# Patient Record
Sex: FEMALE | Race: WHITE | NOT HISPANIC OR LATINO
[De-identification: names, ages, dates, MRNs, and addresses within clinical notes are randomized per-mention and may not be internally consistent; named-entity substitution may affect disease eponyms.]

---

## 2017-04-06 ENCOUNTER — RESULT REVIEW (OUTPATIENT)
Age: 47
End: 2017-04-06

## 2017-04-07 PROBLEM — Z00.00 ENCOUNTER FOR PREVENTIVE HEALTH EXAMINATION: Status: ACTIVE | Noted: 2017-04-07

## 2017-04-13 ENCOUNTER — OUTPATIENT (OUTPATIENT)
Dept: OUTPATIENT SERVICES | Facility: HOSPITAL | Age: 47
LOS: 1 days | End: 2017-04-13

## 2017-04-13 ENCOUNTER — APPOINTMENT (OUTPATIENT)
Dept: MAMMOGRAPHY | Facility: CLINIC | Age: 47
End: 2017-04-13

## 2017-04-13 ENCOUNTER — APPOINTMENT (OUTPATIENT)
Dept: ULTRASOUND IMAGING | Facility: CLINIC | Age: 47
End: 2017-04-13

## 2017-05-04 ENCOUNTER — OUTPATIENT (OUTPATIENT)
Dept: OUTPATIENT SERVICES | Facility: HOSPITAL | Age: 47
LOS: 1 days | End: 2017-05-04

## 2017-05-04 DIAGNOSIS — Z00.8 ENCOUNTER FOR OTHER GENERAL EXAMINATION: ICD-10-CM

## 2018-04-17 ENCOUNTER — RESULT REVIEW (OUTPATIENT)
Age: 48
End: 2018-04-17

## 2018-12-17 ENCOUNTER — RESULT REVIEW (OUTPATIENT)
Age: 48
End: 2018-12-17

## 2019-07-10 ENCOUNTER — APPOINTMENT (OUTPATIENT)
Dept: MAMMOGRAPHY | Facility: CLINIC | Age: 49
End: 2019-07-10

## 2019-07-10 ENCOUNTER — APPOINTMENT (OUTPATIENT)
Dept: ULTRASOUND IMAGING | Facility: CLINIC | Age: 49
End: 2019-07-10

## 2021-10-26 ENCOUNTER — TRANSCRIPTION ENCOUNTER (OUTPATIENT)
Age: 51
End: 2021-10-26

## 2024-09-18 ENCOUNTER — APPOINTMENT (OUTPATIENT)
Dept: ORTHOPEDIC SURGERY | Age: 54
End: 2024-09-18
Payer: MEDICAID

## 2024-09-18 VITALS
SYSTOLIC BLOOD PRESSURE: 118 MMHG | HEIGHT: 63 IN | HEART RATE: 69 BPM | DIASTOLIC BLOOD PRESSURE: 80 MMHG | OXYGEN SATURATION: 98 % | BODY MASS INDEX: 20.31 KG/M2 | WEIGHT: 114.64 LBS

## 2024-09-18 DIAGNOSIS — Z96.641 PRESENCE OF RIGHT ARTIFICIAL HIP JOINT: ICD-10-CM

## 2024-09-18 DIAGNOSIS — M16.12 UNILATERAL PRIMARY OSTEOARTHRITIS, LEFT HIP: ICD-10-CM

## 2024-09-18 PROCEDURE — 99204 OFFICE O/P NEW MOD 45 MIN: CPT

## 2024-09-18 PROCEDURE — 73522 X-RAY EXAM HIPS BI 3-4 VIEWS: CPT

## 2024-09-20 ENCOUNTER — TRANSCRIPTION ENCOUNTER (OUTPATIENT)
Age: 54
End: 2024-09-20

## 2024-09-20 ENCOUNTER — APPOINTMENT (OUTPATIENT)
Dept: ORTHOPEDIC SURGERY | Facility: CLINIC | Age: 54
End: 2024-09-20
Payer: MEDICAID

## 2024-09-20 VITALS
OXYGEN SATURATION: 99 % | HEIGHT: 63 IN | TEMPERATURE: 98.3 F | HEART RATE: 53 BPM | DIASTOLIC BLOOD PRESSURE: 70 MMHG | BODY MASS INDEX: 20.2 KG/M2 | SYSTOLIC BLOOD PRESSURE: 106 MMHG | WEIGHT: 114 LBS

## 2024-09-20 DIAGNOSIS — M54.16 RADICULOPATHY, LUMBAR REGION: ICD-10-CM

## 2024-09-20 DIAGNOSIS — M43.17 SPONDYLOLISTHESIS, LUMBOSACRAL REGION: ICD-10-CM

## 2024-09-20 DIAGNOSIS — M54.50 LOW BACK PAIN, UNSPECIFIED: ICD-10-CM

## 2024-09-20 DIAGNOSIS — M54.2 CERVICALGIA: ICD-10-CM

## 2024-09-20 PROCEDURE — 99215 OFFICE O/P EST HI 40 MIN: CPT

## 2024-09-20 PROCEDURE — 72040 X-RAY EXAM NECK SPINE 2-3 VW: CPT

## 2024-09-20 PROCEDURE — 99205 OFFICE O/P NEW HI 60 MIN: CPT

## 2024-09-20 PROCEDURE — 72100 X-RAY EXAM L-S SPINE 2/3 VWS: CPT

## 2024-09-20 NOTE — DISCUSSION/SUMMARY
[de-identified] : We did thorough discussion regarding the available imaging and the multiple potential sources for her present symptoms.  I do feel that the limited range of motion and pain from the left hip itself is what is driving her primary complaints and loss of function.  With regards to the low back specifically she has had some injections in the past and I think this is an excellent strategy and combined with a lumbar specific physical therapy protocol to keep the symptoms at bay for as long as possible.  The surgical treatment for this would be a fusion however I have not recommended this presently.  She will keep us informed her progress  I have spent greater than 60 minutes preparing to see the patient, collecting relevant history, performing a thorough history and physical examination, counseling the patient regarding my findings ordering the appropriate therapies and tests, communicating with other relevant healthcare professionals, documenting my encounter and coordinating care.

## 2024-09-20 NOTE — PHYSICAL EXAM
[UE/LE] : Sensory: Intact in bilateral upper & lower extremities [Normal DTR Reflexes] : DTR reflexes normal [Normal Proprioception] : sensation intact for proprioception [Normal] : No costovertebral angle tenderness and no spinal tenderness [de-identified] : Limited range of motion left hip reproduce left groin pain [de-identified] : [facility] lateral lumbar spine x-ray PACS 9/20/2024 Anterolisthesis L4-5 with some focal kyphosis  AP lateral cervical spine x-ray PACS 9/20/2024 Levels of subaxial disc base degeneration without evidence of instability  MRI cervical spine September [facility] Multilevel disc bulge without central stenosis.  Minimal multilevel foraminal stenosis  MRI lumbar spine September [facility] Partially reduced L4-5 listhesis without considerable stenosis

## 2024-09-20 NOTE — HISTORY OF PRESENT ILLNESS
[de-identified] : 54-year-old female presents for evaluation of left hip low back and radicular pain.  She has been a competitive dancer for many years has been intermittently bothered by aching stiffness and soreness in the low back with occasional radiation into the glutes and the ankles left greater than right.  She has a history of significant hip arthritis status post right total hip arthroplasty in October 2017 with an excellent result.  Recently she has been bothered by significant pain and stiffness in the left groin and hip that is limited her range of motion.  She has seen Dr. Marie for evaluation for possible total hip arthroplasty.  She desires to return to competitive dance this January as well as in May

## 2024-09-20 NOTE — ASSESSMENT
[FreeTextEntry1] : 54-year-old female with history of bilateral hip arthritis status post right total hip arthroplasty 7 years ago with L4-5 isthmic versus degenerative spondylolisthesis intermittent mechanical low back pain and radicular symptoms with painful arthritic left hip

## 2024-09-23 PROBLEM — M54.2 NECK PAIN: Status: ACTIVE | Noted: 2024-09-23

## 2024-09-23 PROBLEM — Z96.641 STATUS POST RIGHT HIP REPLACEMENT: Status: ACTIVE | Noted: 2024-09-23

## 2024-09-23 PROBLEM — M16.12 PRIMARY OSTEOARTHRITIS OF LEFT HIP: Status: ACTIVE | Noted: 2024-09-18

## 2024-09-23 NOTE — HISTORY OF PRESENT ILLNESS
[de-identified] : Patient is a pleasant 54-year-old woman comes in for evaluation of her left hip.  She has had pain that is developed in the groin and lateral hip.  She notices it with rotation.  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  She also notes some low back pain.  She is also noticed some catching pain in the hip.  This is very similar to symptoms that developed on the right before hip replacement and she is anxious to have the left hip addressed as she has a job upcoming in the spring that she cannot do the results for.  Weightbearing activities are more difficult for her as well.  She rates the pain at a fairly high level at 8 out of 10.  She is taking some over-the-counter medications but no other real treatments at this time.

## 2024-09-23 NOTE — DISCUSSION/SUMMARY
[de-identified] : Status post right total hip arthroplasty with left hip arthritis.  I had a long discussion with the patient regarding hip arthritis / degenerative disease and treatment options. We reviewed the nature and etiology of degenerative hip disease.  We discussed the spectrum of symptomatic treatments. Our discussion included the use of appropriate exercises, activity modifications, weight reduction and maintenance, appropriate medication use, use of assistive devices, role of injections and avoidance of high impact activities.  She is very anxious to have hip arthroplasty as she is concerned about her job in the spring.  She does not want to delay with nonoperative treatments.  I am concerned that some of her pain could be coming from her spine as she does have moderate arthritis but no bone-on-bone apposition.  I am going to refer her for spine evaluation to assess the potential contribution of the spine.  We discussed the possibility of a diagnostic injection but she is not interested in that given her lack of response previously and because of the delay it would cause an hip surgery for her.  We will see her back after that spine evaluation is accomplished.  All questions answered.

## 2024-09-23 NOTE — PHYSICAL EXAM
[de-identified] : Physical exam she is alert and oriented.  She is walking with some antalgia.  She does have discomfort with range of motion of the left hip with positive Stinchfield.  She is neurologically intact.  Her feet are warm well-perfused.  She has a negative Homans. [de-identified] : 3 views of bilateral hips are reviewed.  She has an uncemented hip arthroplasty in the right with no obvious hardware complication loosening fracture or dislocation.  There is moderate arthritis on the left with joint space narrowing sclerosis and marginal osteophyte formation.

## 2024-09-24 ENCOUNTER — NON-APPOINTMENT (OUTPATIENT)
Age: 54
End: 2024-09-24

## 2024-09-30 ENCOUNTER — APPOINTMENT (OUTPATIENT)
Dept: ORTHOPEDIC SURGERY | Facility: CLINIC | Age: 54
End: 2024-09-30
Payer: MEDICAID

## 2024-09-30 ENCOUNTER — RESULT REVIEW (OUTPATIENT)
Age: 54
End: 2024-09-30

## 2024-09-30 ENCOUNTER — OUTPATIENT (OUTPATIENT)
Dept: OUTPATIENT SERVICES | Facility: HOSPITAL | Age: 54
LOS: 1 days | End: 2024-09-30
Payer: MEDICAID

## 2024-09-30 DIAGNOSIS — E83.10 DISORDER OF IRON METABOLISM, UNSPECIFIED: ICD-10-CM

## 2024-09-30 DIAGNOSIS — E55.9 VITAMIN D DEFICIENCY, UNSPECIFIED: ICD-10-CM

## 2024-09-30 DIAGNOSIS — Z22.322 CARRIER OR SUSPECTED CARRIER OF METHICILLIN RESISTANT STAPHYLOCOCCUS AUREUS: ICD-10-CM

## 2024-09-30 DIAGNOSIS — Z01.818 ENCOUNTER FOR OTHER PREPROCEDURAL EXAMINATION: ICD-10-CM

## 2024-09-30 LAB
MRSA SPEC QL CULT: NEGATIVE
STAPH AUREUS (SA): POSITIVE

## 2024-09-30 PROCEDURE — 72190 X-RAY EXAM OF PELVIS: CPT

## 2024-09-30 PROCEDURE — 72190 X-RAY EXAM OF PELVIS: CPT | Mod: 26

## 2024-09-30 PROCEDURE — 99214 OFFICE O/P EST MOD 30 MIN: CPT

## 2024-09-30 NOTE — PHYSICAL EXAM
[de-identified] : General: AxO x 3   Neuro: 5/5 strength with foot eversion, foot inversion, dorsiflexion, plantar flexion, sensation is intact over the lower extremity in L2-S1 nerve distributions. 2+ dorsalis pedis and posterior tibial pulses.    Skin: unremarkable   Hip: No instability appreciated through gentle ROM, + painful ROM   [de-identified] : Previous imaging studies do show moderate to severe arthritis of the joint with joint space narrowing sclerosis and marginal osteophyte formation

## 2024-09-30 NOTE — HISTORY OF PRESENT ILLNESS
[de-identified] : EMI FOURNIER is known to me for left hip osteoarthritis and is also s/p right hip replacement. Since we last saw them, she's doing ok but reports her hip pain has persisted and is very debilitating. She notes the pain is worse with activities including walking and taking stairs and rates it 10 out of 10 at its worst. She feels catching of the hip and has difficulty with rotation of the hip. She did see Dr. Ahn who felt her hip OA was the major cause of her pain. She has been treated non-surgically with anti-inflammatory medications and activity modification.  She started PT as instructed but cannot tolerate it and cannot tolerate the home exercises.  She is also having trouble tolerating anti-inflammatories and is giving her stomach irritation she reports.  The left hip pain significantly interferes with their ADLs and quality of life. Denies any fevers and chills or other signs of infection.

## 2024-09-30 NOTE — DISCUSSION/SUMMARY
[de-identified] : Left hip arthritis.  She has seen spine who believes the hip is the primary pain generator and I agree with this.  I again had a long discussion with the patient regarding hip arthritis / degenerative disease and treatment options. We reviewed the nature and etiology of degenerative hip disease.  We discussed the spectrum of symptomatic treatments. Our discussion included the use of appropriate exercises, activity modifications, weight reduction and maintenance, appropriate medication use, use of assistive devices, role of injections and avoidance of high impact activities.  Given the clinical symptoms, physical exam and imaging findings, we discussed the possibility of left hip replacement surgery again today.  We reviewed the elective quality of life nature of the procedure and the details of the surgery, approach and the implants used, using the model  in the clinic. This included discussion of the material and fixation options.  We also discussed the risks, benefits and expected and potential outcomes at length.  The details of those risks are below.  Category 1 includes risks that could occur in association with any operation. They include heart attack, stroke, blood clot and pulmonary embolism, wound infection, transfusion reaction, drug allergy, and complications related to anesthesia. This list is not intended to be complete but only to convey a broad range of general medical risks to be aware of.  Blood clots may lead to a block in circulation. A blood clot that completely blocks a large artery can lead to gangrene. If this happens an amputation may be required. Blood clots in leg veins lead to pain and swelling. If part of the clot breaks off it can travel to the brain and lead to a stroke. A clot can also travel to the lung, resulting in a pulmonary embolism. Medication after surgery will minimize but not eliminate these complications.  Category 2 is a list of risks and complications specifically related to total or partial joint replacement. This list is not complete but is intended to make the patient aware of the kinds of implant-related risks and complications that might occur.    1. If the device gets loose or wears out further surgery may be required to revise the prosthesis. 2. If an infection develops, further surgery to washout the joint, remove or replace parts, or insert an antibiotic spacer may be required 3. Muscle, Tendon, Nerve and blood vessel damage may result as a consequence of mobilization of the joint or dissection near these structures. These injuries can lead to weakness, numbness and paralysis. The damage may be temporary or permanent. The recovery process can be long and may require further procedures.   4. Dislocations and instability may also require further surgery.   5. Periprosthetic fracture requiring revision surgery. 6. Leg length discrepancy  7. Stiffness 8. Wound complications requiring either local wound care, revision surgery, or plastic surgery consultation  9. Chronic pain requiring pain management  Patient is very anxious to proceed with surgical therapy given the severe pain she is have and lack of any response to conservative treatments.  Given her symptoms exam and imaging findings I think this is reasonable.  We discussed the use of robotic assistance with preoperative CT scan and accessory incisions and she prefers that approach.  We discussed the use of a posterior approach with home discharge.  She is a possible candidate for same-day discharge although she reports at her last surgery on the right side she did have to stay overnight due to what sounds like hypotension.  She will certainly need appropriate medical evaluations prior to surgery.  She expressed understanding and agreement with this and strong desire to proceed as soon as possible.   Plan: Left robotically assisted total hip arthroplasty  Equipment: Mary Trident cup with dual mobility backup; Accolade stem  Evaluations: MANN CT And associated x-rays; PCP Rheum (psoriatic arthritis - C-spine films)

## 2024-10-01 ENCOUNTER — NON-APPOINTMENT (OUTPATIENT)
Age: 54
End: 2024-10-01

## 2024-10-01 LAB
25(OH)D3 SERPL-MCNC: 49 NG/ML
ALBUMIN SERPL ELPH-MCNC: 4.7 G/DL
ALP BLD-CCNC: 77 U/L
ALT SERPL-CCNC: 12 U/L
ANION GAP SERPL CALC-SCNC: 15 MMOL/L
AST SERPL-CCNC: 18 U/L
BASOPHILS # BLD AUTO: 0.07 K/UL
BASOPHILS NFR BLD AUTO: 0.8 %
BILIRUB SERPL-MCNC: 0.3 MG/DL
BUN SERPL-MCNC: 16 MG/DL
CALCIUM SERPL-MCNC: 9.5 MG/DL
CHLORIDE SERPL-SCNC: 98 MMOL/L
CO2 SERPL-SCNC: 27 MMOL/L
CREAT SERPL-MCNC: 0.82 MG/DL
EGFR: 86 ML/MIN/1.73M2
EOSINOPHIL # BLD AUTO: 0.39 K/UL
EOSINOPHIL NFR BLD AUTO: 4.7 %
GLUCOSE SERPL-MCNC: 136 MG/DL
HCT VFR BLD CALC: 43.2 %
HGB BLD-MCNC: 13.2 G/DL
IMM GRANULOCYTES NFR BLD AUTO: 0.2 %
LYMPHOCYTES # BLD AUTO: 2.3 K/UL
LYMPHOCYTES NFR BLD AUTO: 27.6 %
MAN DIFF?: NORMAL
MCHC RBC-ENTMCNC: 29.3 PG
MCHC RBC-ENTMCNC: 30.6 GM/DL
MCV RBC AUTO: 95.8 FL
MONOCYTES # BLD AUTO: 0.41 K/UL
MONOCYTES NFR BLD AUTO: 4.9 %
NEUTROPHILS # BLD AUTO: 5.15 K/UL
NEUTROPHILS NFR BLD AUTO: 61.8 %
PLATELET # BLD AUTO: 338 K/UL
POTASSIUM SERPL-SCNC: 4.1 MMOL/L
PREALB SERPL NEPH-MCNC: 24 MG/DL
PROT SERPL-MCNC: 7.4 G/DL
RBC # BLD: 4.51 M/UL
RBC # FLD: 14.1 %
SODIUM SERPL-SCNC: 139 MMOL/L
TRANSFERRIN SERPL-MCNC: 270 MG/DL
WBC # FLD AUTO: 8.34 K/UL

## 2024-10-02 DIAGNOSIS — L40.50 ARTHROPATHIC PSORIASIS, UNSPECIFIED: ICD-10-CM

## 2024-10-07 ENCOUNTER — OUTPATIENT (OUTPATIENT)
Dept: OUTPATIENT SERVICES | Facility: HOSPITAL | Age: 54
LOS: 1 days | End: 2024-10-07

## 2024-10-07 ENCOUNTER — APPOINTMENT (OUTPATIENT)
Dept: CT IMAGING | Facility: CLINIC | Age: 54
End: 2024-10-07
Payer: MEDICAID

## 2024-10-07 PROCEDURE — 73700 CT LOWER EXTREMITY W/O DYE: CPT | Mod: 26,LT

## 2024-10-08 ENCOUNTER — NON-APPOINTMENT (OUTPATIENT)
Age: 54
End: 2024-10-08

## 2024-10-10 ENCOUNTER — APPOINTMENT (OUTPATIENT)
Dept: RHEUMATOLOGY | Facility: CLINIC | Age: 54
End: 2024-10-10
Payer: MEDICAID

## 2024-10-10 ENCOUNTER — NON-APPOINTMENT (OUTPATIENT)
Age: 54
End: 2024-10-10

## 2024-10-10 VITALS
WEIGHT: 114 LBS | OXYGEN SATURATION: 99 % | TEMPERATURE: 97.1 F | SYSTOLIC BLOOD PRESSURE: 99 MMHG | HEART RATE: 76 BPM | DIASTOLIC BLOOD PRESSURE: 60 MMHG | HEIGHT: 63 IN | BODY MASS INDEX: 20.2 KG/M2

## 2024-10-10 DIAGNOSIS — Z78.9 OTHER SPECIFIED HEALTH STATUS: ICD-10-CM

## 2024-10-10 DIAGNOSIS — M43.17 SPONDYLOLISTHESIS, LUMBOSACRAL REGION: ICD-10-CM

## 2024-10-10 DIAGNOSIS — Z84.0 FAMILY HISTORY OF DISEASES OF THE SKIN AND SUBCUTANEOUS TISSUE: ICD-10-CM

## 2024-10-10 DIAGNOSIS — M16.12 UNILATERAL PRIMARY OSTEOARTHRITIS, LEFT HIP: ICD-10-CM

## 2024-10-10 PROCEDURE — G2211 COMPLEX E/M VISIT ADD ON: CPT | Mod: NC

## 2024-10-10 PROCEDURE — 99204 OFFICE O/P NEW MOD 45 MIN: CPT

## 2024-10-11 RX ORDER — CEPHALEXIN 500 MG/1
500 CAPSULE ORAL
Qty: 3 | Refills: 0 | Status: ACTIVE | COMMUNITY
Start: 2024-10-11 | End: 1900-01-01

## 2024-10-11 RX ORDER — TRAMADOL HYDROCHLORIDE 50 MG/1
50 TABLET, COATED ORAL
Qty: 25 | Refills: 0 | Status: ACTIVE | COMMUNITY
Start: 2024-10-11 | End: 1900-01-01

## 2024-10-11 RX ORDER — ASPIRIN 81 MG/1
81 TABLET, DELAYED RELEASE ORAL
Qty: 60 | Refills: 0 | Status: ACTIVE | COMMUNITY
Start: 2024-10-11 | End: 1900-01-01

## 2024-10-11 RX ORDER — MELOXICAM 15 MG/1
15 TABLET ORAL
Qty: 30 | Refills: 0 | Status: ACTIVE | COMMUNITY
Start: 2024-10-11 | End: 1900-01-01

## 2024-10-11 RX ORDER — FAMOTIDINE 20 MG/1
20 TABLET, FILM COATED ORAL
Qty: 30 | Refills: 0 | Status: ACTIVE | COMMUNITY
Start: 2024-10-11 | End: 1900-01-01

## 2024-10-11 RX ORDER — ACETAMINOPHEN 500 MG/1
500 TABLET ORAL
Qty: 84 | Refills: 0 | Status: ACTIVE | COMMUNITY
Start: 2024-10-11 | End: 1900-01-01

## 2024-10-11 RX ORDER — ONDANSETRON 4 MG/1
4 TABLET, ORALLY DISINTEGRATING ORAL
Qty: 16 | Refills: 0 | Status: ACTIVE | COMMUNITY
Start: 2024-10-11 | End: 1900-01-01

## 2024-10-11 RX ORDER — POLYETHYLENE GLYCOL 3350 17 G/17G
17 POWDER, FOR SOLUTION ORAL
Qty: 14 | Refills: 0 | Status: ACTIVE | COMMUNITY
Start: 2024-10-11 | End: 1900-01-01

## 2024-10-14 ENCOUNTER — NON-APPOINTMENT (OUTPATIENT)
Age: 54
End: 2024-10-14

## 2024-10-16 PROBLEM — Z84.0 FAMILY HISTORY OF PSORIASIS: Status: ACTIVE | Noted: 2024-10-16

## 2024-10-16 PROBLEM — Z78.9 NON-SMOKER: Status: ACTIVE | Noted: 2024-10-16

## 2024-10-17 ENCOUNTER — TRANSCRIPTION ENCOUNTER (OUTPATIENT)
Age: 54
End: 2024-10-17

## 2024-10-18 ENCOUNTER — TRANSCRIPTION ENCOUNTER (OUTPATIENT)
Age: 54
End: 2024-10-18

## 2024-10-18 DIAGNOSIS — Z96.642 PRESENCE OF LEFT ARTIFICIAL HIP JOINT: ICD-10-CM

## 2024-10-19 ENCOUNTER — APPOINTMENT (OUTPATIENT)
Dept: RADIOLOGY | Facility: CLINIC | Age: 54
End: 2024-10-19
Payer: MEDICAID

## 2024-10-19 ENCOUNTER — OUTPATIENT (OUTPATIENT)
Dept: OUTPATIENT SERVICES | Facility: HOSPITAL | Age: 54
LOS: 1 days | End: 2024-10-19

## 2024-10-19 PROCEDURE — 72040 X-RAY EXAM NECK SPINE 2-3 VW: CPT | Mod: 26

## 2024-10-21 VITALS
DIASTOLIC BLOOD PRESSURE: 70 MMHG | RESPIRATION RATE: 16 BRPM | SYSTOLIC BLOOD PRESSURE: 110 MMHG | WEIGHT: 121.25 LBS | TEMPERATURE: 98 F | HEART RATE: 58 BPM | OXYGEN SATURATION: 96 % | HEIGHT: 63.5 IN

## 2024-10-21 RX ORDER — CHLORHEXIDINE GLUCONATE 40 MG/ML
1 SOLUTION TOPICAL ONCE
Refills: 0 | Status: DISCONTINUED | OUTPATIENT
Start: 2024-10-22 | End: 2024-10-23

## 2024-10-21 RX ORDER — POVIDONE-IODINE 0.07 MG/ML
1 SOLUTION TOPICAL ONCE
Refills: 0 | Status: DISCONTINUED | OUTPATIENT
Start: 2024-10-22 | End: 2024-10-23

## 2024-10-21 NOTE — H&P ADULT - NSHPPHYSICALEXAM_GEN_ALL_CORE
MSK: decreased left hip ROM secondary to pain    Remainder of PE per medical clearance note MSK: decreased left hip ROM secondary to pain  MSK: No deformity or open wounds. No rashes or lesions. EHL/TA/GS/FHL 5/5 BLE. Sensation intact and equal BLE. Skin warm and well perfused. DP palpable BLE. Cap refill brisk.   Calves soft, nontender BLE   Remainder of PE per medical clearance note

## 2024-10-21 NOTE — H&P ADULT - HISTORY OF PRESENT ILLNESS
54yoF with left hip pain x     Patient HAS/HAS NOT been using opioid pain medications on a regular basis for more than 90 days prior to the date of surgery.    [facility], seen by rheumatology pre operatively without evidence of psoriatic arthritis. Was also a former professional dancer.     Presents today for elective left total hip replacement with Dr. Marie 54yoF with left hip pain x 7 years that has been worsening since August. Pain persists despite conservative measures including PT and injections. Pt ambulates with no assistance at baseline. Pt denies numbness, tingling, paresthesias to BLE.   Patient has not been using opioid pain medications on a regular basis for more than 90 days prior to the date of surgery.    Pt previously dx with psoriatic arthritis by [facility] for PIP joint swelling in 2022, seen by rheumatology pre operatively without evidence of psoriatic arthritis. Was also a former professional dancer.     Presents today for elective left total hip replacement with Dr. Marie

## 2024-10-21 NOTE — H&P ADULT - PROBLEM SELECTOR PLAN 1
Admit to Orthopaedic Service.  Presents today for elective left total hip replacement  Pt medically stable and cleared for procedure today by Dr. Arroyo and Dr. Philippe

## 2024-10-21 NOTE — ASU PATIENT PROFILE, ADULT - FALL HARM RISK - UNIVERSAL INTERVENTIONS
Bed in lowest position, wheels locked, appropriate side rails in place/Call bell, personal items and telephone in reach/Instruct patient to call for assistance before getting out of bed or chair/Non-slip footwear when patient is out of bed/[place] to call system/Physically safe environment - no spills, clutter or unnecessary equipment/Purposeful Proactive Rounding/Room/bathroom lighting operational, light cord in reach

## 2024-10-21 NOTE — H&P ADULT - NSHPLABSRESULTS_GEN_ALL_CORE
Preop CBC, BMP, PT/INR, PTT within normal range and reviewed per medical clearance  H/H: 13.2/41.4  Cr: 0.9  A1c: 5.1  ****preop BP 90/58 on outpatient clearance note  Preop EKG within normal limits and reviewed per medical clearance  3M: DOS  MRSA negative, MSSA positive Preop CBC, BMP, within normal range and reviewed per medical clearance  H/H: 13.2/41.4  Cr: 0.9  A1c: 5.1  ****preop BP 90/58 on outpatient clearance note  Preop EKG within normal limits and reviewed per medical clearance  3M: DOS  MRSA negative, MSSA positive Preop CBC, BMP, within normal range and reviewed per medical clearance  H/H: 13.2/41.4  Cr: 0.9  A1c: 5.1  Preop BP 90/58 on outpatient clearance note  Preop EKG within normal limits and reviewed per medical clearance  3M: DOS  MRSA negative, MSSA positive

## 2024-10-21 NOTE — H&P ADULT - PROBLEM SELECTOR PLAN 2
H/o. Rheumatology clearance without concern for psoriatic arthritis, likely degenerative osteoarthritis of left hip.

## 2024-10-21 NOTE — H&P ADULT - NSICDXPASTSURGICALHX_GEN_ALL_CORE_FT
PAST SURGICAL HISTORY:  H/O arthroscopy of knee right    H/O LEEP     History of ankle surgery left    History of dilation and curettage     History of repair of hip joint right

## 2024-10-21 NOTE — ASU PATIENT PROFILE, ADULT - NSICDXPASTSURGICALHX_GEN_ALL_CORE_FT
PAST SURGICAL HISTORY:  H/O arthroscopy of knee     H/O LEEP     History of ankle surgery     History of dilation and curettage     History of repair of hip joint right     PAST SURGICAL HISTORY:  H/O arthroscopy of knee right    H/O LEEP     History of ankle surgery left    History of dilation and curettage     History of repair of hip joint right

## 2024-10-22 ENCOUNTER — APPOINTMENT (OUTPATIENT)
Dept: ORTHOPEDIC SURGERY | Facility: HOSPITAL | Age: 54
End: 2024-10-22

## 2024-10-22 ENCOUNTER — TRANSCRIPTION ENCOUNTER (OUTPATIENT)
Age: 54
End: 2024-10-22

## 2024-10-22 ENCOUNTER — RESULT REVIEW (OUTPATIENT)
Age: 54
End: 2024-10-22

## 2024-10-22 ENCOUNTER — INPATIENT (INPATIENT)
Facility: HOSPITAL | Age: 54
LOS: 0 days | Discharge: HOME | DRG: 470 | End: 2024-10-23
Attending: SPECIALIST | Admitting: SPECIALIST
Payer: MEDICAID

## 2024-10-22 DIAGNOSIS — Z86.79 PERSONAL HISTORY OF OTHER DISEASES OF THE CIRCULATORY SYSTEM: ICD-10-CM

## 2024-10-22 DIAGNOSIS — Z98.890 OTHER SPECIFIED POSTPROCEDURAL STATES: Chronic | ICD-10-CM

## 2024-10-22 DIAGNOSIS — M19.90 UNSPECIFIED OSTEOARTHRITIS, UNSPECIFIED SITE: ICD-10-CM

## 2024-10-22 PROCEDURE — S2900 ROBOTIC SURGICAL SYSTEM: CPT | Mod: NC

## 2024-10-22 PROCEDURE — 27130 TOTAL HIP ARTHROPLASTY: CPT | Mod: LT

## 2024-10-22 PROCEDURE — 72170 X-RAY EXAM OF PELVIS: CPT | Mod: 26

## 2024-10-22 DEVICE — MAKO CHECKPOINT 3.5MM HEX X 15MM: Type: IMPLANTABLE DEVICE | Status: FUNCTIONAL

## 2024-10-22 DEVICE — MAKO CHECKPOINT 3.5MM HEX IMPACTION: Type: IMPLANTABLE DEVICE | Status: FUNCTIONAL

## 2024-10-22 DEVICE — SHELL ACET TRIDENT II D 48MM: Type: IMPLANTABLE DEVICE | Status: FUNCTIONAL

## 2024-10-22 DEVICE — SCREW HEX LO PROF 6.5X30MM: Type: IMPLANTABLE DEVICE | Status: FUNCTIONAL

## 2024-10-22 DEVICE — STEM CMT HIP ACCOLADE II V40 30X101MM 132D SZ 3: Type: IMPLANTABLE DEVICE | Status: FUNCTIONAL

## 2024-10-22 DEVICE — FEM HEAD V40 32MM -4MM: Type: IMPLANTABLE DEVICE | Status: FUNCTIONAL

## 2024-10-22 DEVICE — MAKO BONE PIN 4MM X 140MM: Type: IMPLANTABLE DEVICE | Status: FUNCTIONAL

## 2024-10-22 DEVICE — LINER ACET TRIDENT X3 10 DEG 32MM D: Type: IMPLANTABLE DEVICE | Status: FUNCTIONAL

## 2024-10-22 RX ORDER — APREPITANT 40 MG/1
40 CAPSULE ORAL ONCE
Refills: 0 | Status: COMPLETED | OUTPATIENT
Start: 2024-10-22 | End: 2024-10-22

## 2024-10-22 RX ORDER — FAMOTIDINE 10 MG/ML
20 INJECTION INTRAVENOUS DAILY
Refills: 0 | Status: DISCONTINUED | OUTPATIENT
Start: 2024-10-22 | End: 2024-10-23

## 2024-10-22 RX ORDER — ASPIRIN/MAG CARB/ALUMINUM AMIN 325 MG
81 TABLET ORAL
Refills: 0 | Status: DISCONTINUED | OUTPATIENT
Start: 2024-10-23 | End: 2024-10-23

## 2024-10-22 RX ORDER — POLYETHYLENE GLYCOL 3350 17 G/17G
17 POWDER, FOR SOLUTION ORAL AT BEDTIME
Refills: 0 | Status: DISCONTINUED | OUTPATIENT
Start: 2024-10-22 | End: 2024-10-23

## 2024-10-22 RX ORDER — ACETAMINOPHEN 500 MG
1000 TABLET ORAL ONCE
Refills: 0 | Status: COMPLETED | OUTPATIENT
Start: 2024-10-22 | End: 2024-10-22

## 2024-10-22 RX ORDER — ACETAMINOPHEN 500 MG
1000 TABLET ORAL EVERY 8 HOURS
Refills: 0 | Status: DISCONTINUED | OUTPATIENT
Start: 2024-10-22 | End: 2024-10-23

## 2024-10-22 RX ORDER — HYDROMORPHONE HCL/0.9% NACL/PF 6 MG/30 ML
0.5 PATIENT CONTROLLED ANALGESIA SYRINGE INTRAVENOUS ONCE
Refills: 0 | Status: DISCONTINUED | OUTPATIENT
Start: 2024-10-22 | End: 2024-10-23

## 2024-10-22 RX ORDER — CEFAZOLIN SODIUM 1 G
2000 VIAL (EA) INJECTION EVERY 8 HOURS
Refills: 0 | Status: COMPLETED | OUTPATIENT
Start: 2024-10-22 | End: 2024-10-23

## 2024-10-22 RX ORDER — SENNA 187 MG
2 TABLET ORAL AT BEDTIME
Refills: 0 | Status: DISCONTINUED | OUTPATIENT
Start: 2024-10-22 | End: 2024-10-23

## 2024-10-22 RX ORDER — ONDANSETRON HYDROCHLORIDE 2 MG/ML
4 INJECTION, SOLUTION INTRAMUSCULAR; INTRAVENOUS EVERY 6 HOURS
Refills: 0 | Status: DISCONTINUED | OUTPATIENT
Start: 2024-10-22 | End: 2024-10-23

## 2024-10-22 RX ORDER — OXYCODONE HYDROCHLORIDE 30 MG/1
5 TABLET ORAL EVERY 6 HOURS
Refills: 0 | Status: DISCONTINUED | OUTPATIENT
Start: 2024-10-22 | End: 2024-10-23

## 2024-10-22 RX ORDER — MAGNESIUM HYDROXIDE 1200 MG/15ML
30 SUSPENSION ORAL DAILY
Refills: 0 | Status: DISCONTINUED | OUTPATIENT
Start: 2024-10-22 | End: 2024-10-23

## 2024-10-22 RX ORDER — TRAMADOL HYDROCHLORIDE 50 MG/1
50 TABLET, COATED ORAL EVERY 6 HOURS
Refills: 0 | Status: DISCONTINUED | OUTPATIENT
Start: 2024-10-22 | End: 2024-10-23

## 2024-10-22 RX ORDER — B-COMPLEX WITH VITAMIN C
1 VIAL (ML) INJECTION DAILY
Refills: 0 | Status: DISCONTINUED | OUTPATIENT
Start: 2024-10-22 | End: 2024-10-23

## 2024-10-22 RX ADMIN — Medication 1000 MILLIGRAM(S): at 07:36

## 2024-10-22 RX ADMIN — FAMOTIDINE 20 MILLIGRAM(S): 10 INJECTION INTRAVENOUS at 14:33

## 2024-10-22 RX ADMIN — Medication 1000 MILLIGRAM(S): at 21:35

## 2024-10-22 RX ADMIN — Medication 100 MILLIGRAM(S): at 18:31

## 2024-10-22 RX ADMIN — Medication 75 MILLILITER(S): at 14:34

## 2024-10-22 RX ADMIN — OXYCODONE HYDROCHLORIDE 5 MILLIGRAM(S): 30 TABLET ORAL at 20:13

## 2024-10-22 RX ADMIN — Medication 1000 MILLIGRAM(S): at 14:35

## 2024-10-22 RX ADMIN — Medication 1000 MILLIGRAM(S): at 22:05

## 2024-10-22 RX ADMIN — APREPITANT 40 MILLIGRAM(S): 40 CAPSULE ORAL at 07:36

## 2024-10-22 NOTE — PHYSICAL THERAPY INITIAL EVALUATION ADULT - GAIT DEVIATIONS NOTED, PT EVAL
fairly steady gait, no lose of balance, decreased heel strike and hip flexion LLE./decreased santana/increased time in double stance/decreased step length

## 2024-10-22 NOTE — PHYSICAL THERAPY INITIAL EVALUATION ADULT - GENERAL OBSERVATIONS, REHAB EVAL
Pt received semi supine in bed with +hep-lock, L hip dressing C/D/I, NAD. Pt left as found, NAD, call bell in reach, +bed alarm, L hip dressing C/D/I, JOHANN morgan

## 2024-10-22 NOTE — PHYSICAL THERAPY INITIAL EVALUATION ADULT - ADDITIONAL COMMENTS
Pt lives with 11 yo child in an apt with elevator, denies stairs. Prior to admission, pt ambulated independently without AD. Pt has a cane at home. Pt has a walk in shower/bathtub.

## 2024-10-22 NOTE — PRE-ANESTHESIA EVALUATION ADULT - NSANTHADDINFOFT_GEN_ALL_CORE
Discussed the risks and benefits of neuraxial anesthesia including bleeding, infection, and nerve damage.    Discussed risks of sedation and general anesthesia. All questions answered and patient is in agreement    Discussed the emergency plan of general anesthesia and explained all of the risks and benefits of general anesthesia- including risk of cardiopulmonary compromise, eye injury, sore throat, airway injury, postoperative nausea and vomiting, and nerve injury. All questions were answered and patient is in agreement for sedation, understanding the risks of general anesthesia if the need arises

## 2024-10-23 ENCOUNTER — TRANSCRIPTION ENCOUNTER (OUTPATIENT)
Age: 54
End: 2024-10-23

## 2024-10-23 ENCOUNTER — NON-APPOINTMENT (OUTPATIENT)
Age: 54
End: 2024-10-23

## 2024-10-23 VITALS — SYSTOLIC BLOOD PRESSURE: 103 MMHG | DIASTOLIC BLOOD PRESSURE: 54 MMHG

## 2024-10-23 LAB
ANION GAP SERPL CALC-SCNC: 9 MMOL/L — SIGNIFICANT CHANGE UP (ref 5–17)
BUN SERPL-MCNC: 10 MG/DL — SIGNIFICANT CHANGE UP (ref 7–23)
CALCIUM SERPL-MCNC: 8.7 MG/DL — SIGNIFICANT CHANGE UP (ref 8.4–10.5)
CHLORIDE SERPL-SCNC: 105 MMOL/L — SIGNIFICANT CHANGE UP (ref 96–108)
CO2 SERPL-SCNC: 29 MMOL/L — SIGNIFICANT CHANGE UP (ref 22–31)
CREAT SERPL-MCNC: 0.78 MG/DL — SIGNIFICANT CHANGE UP (ref 0.5–1.3)
EGFR: 91 ML/MIN/1.73M2 — SIGNIFICANT CHANGE UP
GLUCOSE SERPL-MCNC: 105 MG/DL — HIGH (ref 70–99)
HCT VFR BLD CALC: 31.6 % — LOW (ref 34.5–45)
HGB BLD-MCNC: 10.3 G/DL — LOW (ref 11.5–15.5)
MCHC RBC-ENTMCNC: 30.4 PG — SIGNIFICANT CHANGE UP (ref 27–34)
MCHC RBC-ENTMCNC: 32.6 GM/DL — SIGNIFICANT CHANGE UP (ref 32–36)
MCV RBC AUTO: 93.2 FL — SIGNIFICANT CHANGE UP (ref 80–100)
NRBC # BLD: 0 /100 WBCS — SIGNIFICANT CHANGE UP (ref 0–0)
PLATELET # BLD AUTO: 259 K/UL — SIGNIFICANT CHANGE UP (ref 150–400)
POTASSIUM SERPL-MCNC: 4 MMOL/L — SIGNIFICANT CHANGE UP (ref 3.5–5.3)
POTASSIUM SERPL-SCNC: 4 MMOL/L — SIGNIFICANT CHANGE UP (ref 3.5–5.3)
RBC # BLD: 3.39 M/UL — LOW (ref 3.8–5.2)
RBC # FLD: 13.5 % — SIGNIFICANT CHANGE UP (ref 10.3–14.5)
SODIUM SERPL-SCNC: 143 MMOL/L — SIGNIFICANT CHANGE UP (ref 135–145)
WBC # BLD: 12.94 K/UL — HIGH (ref 3.8–10.5)
WBC # FLD AUTO: 12.94 K/UL — HIGH (ref 3.8–10.5)

## 2024-10-23 PROCEDURE — C1713: CPT

## 2024-10-23 PROCEDURE — 72170 X-RAY EXAM OF PELVIS: CPT

## 2024-10-23 PROCEDURE — C1776: CPT

## 2024-10-23 PROCEDURE — 85027 COMPLETE CBC AUTOMATED: CPT

## 2024-10-23 PROCEDURE — S2900: CPT

## 2024-10-23 PROCEDURE — 99221 1ST HOSP IP/OBS SF/LOW 40: CPT

## 2024-10-23 PROCEDURE — 97161 PT EVAL LOW COMPLEX 20 MIN: CPT

## 2024-10-23 PROCEDURE — 97530 THERAPEUTIC ACTIVITIES: CPT

## 2024-10-23 PROCEDURE — 36415 COLL VENOUS BLD VENIPUNCTURE: CPT

## 2024-10-23 PROCEDURE — 80048 BASIC METABOLIC PNL TOTAL CA: CPT

## 2024-10-23 PROCEDURE — 97116 GAIT TRAINING THERAPY: CPT

## 2024-10-23 PROCEDURE — C1889: CPT

## 2024-10-23 RX ORDER — ASPIRIN/MAG CARB/ALUMINUM AMIN 325 MG
1 TABLET ORAL
Qty: 0 | Refills: 0 | DISCHARGE
Start: 2024-10-23

## 2024-10-23 RX ORDER — ONDANSETRON HYDROCHLORIDE 2 MG/ML
1 INJECTION, SOLUTION INTRAMUSCULAR; INTRAVENOUS
Qty: 0 | Refills: 0 | DISCHARGE
Start: 2024-10-23

## 2024-10-23 RX ORDER — ACETAMINOPHEN 500 MG
2 TABLET ORAL
Qty: 0 | Refills: 0 | DISCHARGE
Start: 2024-10-23

## 2024-10-23 RX ORDER — FAMOTIDINE 10 MG/ML
1 INJECTION INTRAVENOUS
Qty: 0 | Refills: 0 | DISCHARGE
Start: 2024-10-23

## 2024-10-23 RX ORDER — OXYCODONE HYDROCHLORIDE 30 MG/1
1 TABLET ORAL
Qty: 28 | Refills: 0
Start: 2024-10-23 | End: 2024-10-29

## 2024-10-23 RX ADMIN — Medication 1 TABLET(S): at 12:41

## 2024-10-23 RX ADMIN — Medication 81 MILLIGRAM(S): at 05:29

## 2024-10-23 RX ADMIN — OXYCODONE HYDROCHLORIDE 5 MILLIGRAM(S): 30 TABLET ORAL at 12:41

## 2024-10-23 RX ADMIN — TRAMADOL HYDROCHLORIDE 50 MILLIGRAM(S): 50 TABLET, COATED ORAL at 00:18

## 2024-10-23 RX ADMIN — Medication 100 MILLIGRAM(S): at 01:00

## 2024-10-23 RX ADMIN — Medication 1000 MILLIGRAM(S): at 05:59

## 2024-10-23 RX ADMIN — OXYCODONE HYDROCHLORIDE 5 MILLIGRAM(S): 30 TABLET ORAL at 13:41

## 2024-10-23 RX ADMIN — Medication 1000 MILLIGRAM(S): at 05:29

## 2024-10-23 RX ADMIN — TRAMADOL HYDROCHLORIDE 50 MILLIGRAM(S): 50 TABLET, COATED ORAL at 00:48

## 2024-10-23 RX ADMIN — Medication 1000 MILLIGRAM(S): at 13:05

## 2024-10-23 RX ADMIN — FAMOTIDINE 20 MILLIGRAM(S): 10 INJECTION INTRAVENOUS at 12:41

## 2024-10-23 NOTE — DISCHARGE NOTE NURSING/CASE MANAGEMENT/SOCIAL WORK - FINANCIAL ASSISTANCE
[facility] provides services at a reduced cost to those who are determined to be eligible through [facility]’s financial assistance program. Information regarding [facility]’s financial assistance program can be found by going to [email or web address] or by calling [phone].
Statement Selected

## 2024-10-23 NOTE — DISCHARGE NOTE PROVIDER - NSDCFUADDINST_GEN_ALL_CORE_FT
FOLLOW DR RICHARDS INSTRUCTION SHEETS    [facility] Post Op Instructions for Total Hip Replacement    Weight bearing:  You may bear full weight as tolerated using a walker, crutches or cane as needed. You may use  the walking aid which you were discharged with and switch to a cane whenever you feel  comfortable doing so. You should use an assistive device until you can walk comfortably  without it. Keep in mind that every patient moves at their own speed of recovery so take your  time.    Dressing and Incision Instructions:  Please do not shower for the first 4 days after surgery. You have a waterproof mesh strip over  the main incision. You have an additional dressing near your stomach fold if your hip replacement was done with robotic assistance. Spots of blood may be visible through the  dressing and should not alarm you but call the office if the dressing becomes saturated with blood. Do not remove the mesh strip over the main incision until it peels off very easily in 2-3 weeks or until your first post-op visit. Remove the smaller dressing 4 days after surgery. You can do this yourself or the visiting nurse will assist. If the incision is dry and there is no drainage, you may shower with the dressing off 4 days after surgery. Do not scrub the incision. Pat dry. There is no need to cover the wound with a new dressing if there is no  drainage. You should not immerse the wound in water (bath, swimming, whirlpool) for 3-4 weeks after surgery, or until the wound is completely healed with no scabs or crusts. Do not apply any creams or ointments to your incision. If you observe drainage from the incision after removing the dressing, please call the office.  There are no stitches or staples to be removed.    Care of the surgical site:  Apply ice packs to the surgical site and elevate the leg above the level of your heart when you’re at rest to reduce pain and swelling. For icing, twenty-minute sessions followed by an  hour off is recommended. You should ice as frequently as possible. Ice should NEVER be placed directly on the skin.    Medications:  Take all medications as prescribed by Dr. Marie  Nonsteroidal anti-inflammatory (NSAID) medication is prescribed to reduce pain and inflammation, unless there is a contraindication. You've been prescribed Celebrex 100mg or   twice a day. Take as prescribed, even if your pain is mild. Do not combine Meloxicam or Celebrex with over-the-counter NSAIDs (such as Advil or Aleve) or any other prescribed NSAIDs. If you experience stomach pain or discoloration of your stool, stop the medication, and call your doctor.  Acetaminophen (Tylenol) is prescribed to treat mild to moderate pain and to minimize the amount of narcotic medication required to manage severe pain. You’ve been prescribed Acetaminophen 1,000mg every 6-8 hours for the first few weeks, even when pain is mild, as  this will reduce how often you feel the need to take narcotics. This medication is very safe at prescribed doses. Do not exceed 4,000mg of Acetaminophen in a 24-hour period.  Narcotic (opiate) pain medication(s) are prescribed to treat severe pain. You've been prescribed Tramadol 50mg to take every 6 hours as needed or Oxycodone 5mg to take every  6 hours as needed. Take these medications only if you are experiencing pain and wean off them rapidly if possible as they can be habit-forming.? Call the office if your pain is not well  controlled. If you have severe pain and are running low on medication, you may request a refill. Refills can only be handled during regular business hours. Remember to contact the  office by 4:00 on Friday if you believe you will need medications over the weekend. Side effects of opioids include nausea, respiratory depression, sedation, and constipation. Take a stool softener (Miralax/Senna) if you experience constipation and anti-nausea medication (Zofran/Ondansetron) if you experience nausea. Driving, operating heavy machinery and  drinking alcohol are prohibited while taking these medications. Blood thinners are prescribed to reduce the risk of blood clots forming after surgery. You've been prescribed Aspirin (Ecotrin) 81mg twice a day. Please take the full course as prescribed. If you’ve been prescribed another blood thinner (Eliquis or Xarelto) due to your medical history,  please take as directed. Antacids protect your stomach from acid irritation. You’ve been prescribed Pepcid  (Famotidine) 20mg once daily to help your stomach tolerate the combination of aspirin with an NSAID. Please take the full course as prescribed. You should restart all of your prescription medications once home unless specifically instructed otherwise.    Activity Instructions:  You may feel very tired and it is important to rest when needed in addition to being as active as possible. You will find that your endurance, energy levels, and ability to ambulate improve daily. Although guarantees against dislocation do not exist, the hip was noted to be sufficiently stable  in surgery. Stay within a functional range of motion for all directions (flexion, extension, internal rotation, external rotation, abduction, and adduction). Avoid extremes of motion and uncomfortable or painful positions. You will be provided with a Physical Therapy prescription to start outpatient PT when appropriate and once home PT discharges you. High impact activities such as jumping, aerobics, tennis, and skiing are not permitted during the  first 3 months after surgery. These activities can contribute to accelerated wear and should be done with caution after this time.  Driving is not permitted until you see Dr. Marie in the office.  Sexual activity can resume after 2 weeks. Please contact the office if you need information on  how to do this safely.  Herbal supplements and vitamins may be restarted 2 weeks after surgery.    Follow-up care:  Follow-up in Dr. Marie's office 2-3 weeks after surgery. This appointment has been arranged prior to surgery but if needed, call [phone] to confirm.  Please call the office at [phone] if you experience fever > 101°F, chills, pain that is  increasingly severe, redness of the wound, or unusual wound drainage. Swelling and bruising  of the leg are normal after surgery, but if swelling becomes progressively severe, please call.  If you experience chest pain or difficulty breathing, severe, painful calf swelling call 911 or go  to the nearest ER. Try to avoid going to the Emergency Room for non-emergent concerns  related to your surgical site – these are best managed by your surgeon.  Do not hesitate to call the office at [phone] with any problems or concerns.  You may also e-mail [email or web address] 24 hours a day, 7 days a week  with any problems or concerns but for emergent situations or questions, please call the  office.

## 2024-10-23 NOTE — DISCHARGE NOTE PROVIDER - NSDCMRMEDTOKEN_GEN_ALL_CORE_FT
acetaminophen 500 mg oral tablet: 2 tab(s) orally every 8 hours  aspirin 81 mg oral delayed release tablet: 1 tab(s) orally 2 times a day  famotidine 20 mg oral tablet: 1 tab(s) orally once a day  ondansetron 4 mg oral tablet, disintegratin tab(s) orally every 6 hours as needed for  nausea  oxyCODONE 5 mg oral tablet: 1 tab(s) orally every 6 hours as needed for Severe Pain (7 - 10) MDD: 4

## 2024-10-23 NOTE — DISCHARGE NOTE PROVIDER - CARE PROVIDER_API CALL
Ramakrishna Marie  Orthopaedic Surgery  [address]  [place] [address]  Phone: [phone]  Fax: [phone]  Follow Up Time: 2 weeks

## 2024-10-23 NOTE — CONSULT NOTE ADULT - ASSESSMENT
55 yo F with a PMH of raynaud's syndrome, OA, psoriatic arthritis (diagnosed in 2022 at [facility] but not on any steroids or disease control agents), presenting with several years of L hip pain, now s/p uncomplicated L total hip replacement on 10/22. Medicine is consulted for comanagement.     #L chronic hip pain, OA   -s/p L total hip replacement on 10/22 with ortho   -Pain management per ortho team   -Continue to encourage working with PT   -Recommend patient continue bowel regimen at home, counseled to take her home miralax to ensure has a bowel movement   -DVT ppx w/ ASA BID     #Mild leukocytosis   -Likely reactive iso recent hip replacement.   -Continue to monitor, order blood cultures if any fevers.     #Normocytic anemia   #Acute blood loss anemia   -Recommend having PCP check CBC again in 2-3 weeks to ensure improving, if still anemic at that point would consider further workup such as iron studies, reticulocyte count.     #Raynaud's syndrome   #Psoriatic arthritis   -Not currently active, patient has rheumatologist as outpatient.

## 2024-10-23 NOTE — DISCHARGE NOTE NURSING/CASE MANAGEMENT/SOCIAL WORK - PATIENT PORTAL LINK FT
You can access the FollowMyHealth Patient Portal offered by [facility] by registering at the following website: [email or web address]. By joining [facility]’s FollowMyHealth portal, you will also be able to view your health information using other applications (apps) compatible with our system.

## 2024-10-23 NOTE — CONSULT NOTE ADULT - SUBJECTIVE AND OBJECTIVE BOX
HPI:   Ms. Alford is a 53 yo F with a PMH of raynaud's syndrome, OA, psoriatic arthritis (diagnosed in 2022 at [facility] but not on any steroids or disease control agents), presenting with several years of L hip pain, now s/p uncomplicated L total hip replacement on 10/22.     Seen at bedside this morning, pain is well controlled and able to sleep a bit overnight. Tolerating diet well, no issues with nausea or vomiting. Had a session with PT this morning which went well.     MEDICATIONS  (STANDING):  acetaminophen     Tablet .. 1000 milliGRAM(s) Oral every 8 hours  aspirin enteric coated 81 milliGRAM(s) Oral two times a day  chlorhexidine 2% Cloths 1 Application(s) Topical once  famotidine    Tablet 20 milliGRAM(s) Oral daily  HYDROmorphone  Injectable 0.5 milliGRAM(s) IV Push once  lactated ringers. 1000 milliLiter(s) (75 mL/Hr) IV Continuous <Continuous>  multivitamin 1 Tablet(s) Oral daily  polyethylene glycol 3350 17 Gram(s) Oral at bedtime  povidone iodine 10% Nasal Swab 1 Application(s) Both Nostrils once  senna 2 Tablet(s) Oral at bedtime    MEDICATIONS  (PRN):  magnesium hydroxide Suspension 30 milliLiter(s) Oral daily PRN Constipation  ondansetron Injectable 4 milliGRAM(s) IV Push every 6 hours PRN Nausea and/or Vomiting  oxyCODONE    IR 5 milliGRAM(s) Oral every 6 hours PRN Severe Pain (7 - 10)  traMADol 50 milliGRAM(s) Oral every 6 hours PRN Moderate Pain (4 - 6)    CAPILLARY BLOOD GLUCOSE        I&O's Summary    22 Oct 2024 07:01  -  23 Oct 2024 07:00  --------------------------------------------------------  IN: 0 mL / OUT: 2600 mL / NET: -2600 mL    23 Oct 2024 07:01  -  23 Oct 2024 13:52  --------------------------------------------------------  IN: 345 mL / OUT: 400 mL / NET: -55 mL        PHYSICAL EXAM:  Vital Signs Last 24 Hrs  T(C): 36.7 (23 Oct 2024 09:00), Max: 36.7 (23 Oct 2024 09:00)  T(F): 98 (23 Oct 2024 09:00), Max: 98 (23 Oct 2024 09:00)  HR: 59 (23 Oct 2024 09:00) (59 - 83)  BP: 103/54 (23 Oct 2024 11:31) (94/61 - 107/64)  BP(mean): 78 (23 Oct 2024 09:00) (75 - 81)  RR: 17 (23 Oct 2024 09:00) (16 - 21)  SpO2: 97% (23 Oct 2024 09:00) (95% - 97%)    Parameters below as of 23 Oct 2024 09:00  Patient On (Oxygen Delivery Method): room air    EXAM:   Appears comfortable   MMM  Normal WOB on RA, CTAB   RRR, no mrg   Abdomen soft, nontender, nondistended  Extremities warm and without edema   AOX3, no focal neuro deficits     LABS:                        10.3   12.94 )-----------( 259      ( 23 Oct 2024 05:30 )             31.6     10-23    143  |  105  |  10  ----------------------------<  105[H]  4.0   |  29  |  0.78    Ca    8.7      23 Oct 2024 05:30            Urinalysis Basic - ( 23 Oct 2024 05:30 )    Color: x / Appearance: x / SG: x / pH: x  Gluc: 105 mg/dL / Ketone: x  / Bili: x / Urobili: x   Blood: x / Protein: x / Nitrite: x   Leuk Esterase: x / RBC: x / WBC x   Sq Epi: x / Non Sq Epi: x / Bacteria: x            RADIOLOGY & ADDITIONAL TESTS:  Imaging from Last 24 Hours:  None new

## 2024-10-23 NOTE — DISCHARGE NOTE NURSING/CASE MANAGEMENT/SOCIAL WORK - HAS THE PATIENT RECEIVED THE INFLUENZA VACCINE THIS SEASON?
Post-Care Instructions: I reviewed with the patient in detail post-care instructions. Patient is to wear sunprotection, and avoid picking at any of the treated lesions. Pt may apply Vaseline to crusted or scabbing areas. Consent: The patient's consent was obtained including but not limited to risks of crusting, scabbing, blistering, scarring, darker or lighter pigmentary change, recurrence, incomplete removal and infection. Add 52 Modifier (Optional): no Detail Level: Detailed Medical Necessity Clause: This procedure was medically necessary because the lesions that were treated were: Treatment Number (Will Not Render If 0): 0 no... Medical Necessity Information: It is in your best interest to select a reason for this procedure from the list below. All of these items fulfill various CMS LCD requirements except the new and changing color options.

## 2024-10-23 NOTE — DISCHARGE NOTE PROVIDER - HOSPITAL COURSE
Admitted to Orthopedic service on   Surgery  Any-op Antibiotics  Pain control  DVT prophylaxis  OOB/Physical Therapy   Admitted to Orthopedic service on 10/22  Surgery- Left total hip replacement  Any-op Antibiotics  Pain control  DVT prophylaxis- SCDs, Aspirin  OOB/Physical Therapy

## 2024-10-23 NOTE — PROGRESS NOTE ADULT - SUBJECTIVE AND OBJECTIVE BOX
Ortho Note    Pt seen and examined on morning rounds. Pt comfortable without complaints, pain controlled.  Denies CP, SOB, N/V, numbness/tingling     Vital Signs Last 24 Hrs  T(C): 36.4 (10-23-24 @ 05:41), Max: 36.4 (10-23-24 @ 00:33)  T(F): 97.6 (10-23-24 @ 05:41), Max: 97.6 (10-23-24 @ 05:41)  HR: 63 (10-23-24 @ 05:41) (61 - 63)  BP: 94/61 (10-23-24 @ 05:41) (94/61 - 107/64)  BP(mean): --  RR: 16 (10-23-24 @ 05:41) (16 - 18)  SpO2: 95% (10-23-24 @ 05:41) (95% - 95%)  I&O's Summary    22 Oct 2024 07:01  -  23 Oct 2024 06:29  --------------------------------------------------------  IN: 0 mL / OUT: 2600 mL / NET: -2600 mL      Physical Exam:  General: Pt Alert and oriented, NAD  Prineo / gauze DSG C/D/I  Pulses intact  Sensation: SILT  Motor: Quad/Ham/EHL/FHL/TA/GS 5/5              A/P: 54yFemale POD#1 s/p L ORLY  - Stable  - Pain Control  - DVT ppx: SCDs  - Post op abx: Ancef x 2 doses  - PT, WBS: WBAT  - Dispo: HPT    Félix Grajeda, PGY-4  Ortho Pager [phone]
Ortho Post Op Check    Procedure: L ORLY  Surgeon: Dr. Marie    Pt comfortable without complaints, pain controlled  Denies CP, SOB, N/V, numbness/tingling     Vital Signs Last 24 Hrs  T(C): 36.4 (10-23-24 @ 00:33), Max: 36.5 (10-22-24 @ 21:00)  T(F): 97.5 (10-23-24 @ 00:33), Max: 97.7 (10-22-24 @ 21:00)  HR: 61 (10-23-24 @ 00:33) (61 - 83)  BP: 107/64 (10-23-24 @ 00:33) (106/60 - 107/64)  BP(mean): 75 (10-22-24 @ 21:00) (75 - 75)  RR: 18 (10-23-24 @ 00:33) (18 - 18)  SpO2: 95% (10-23-24 @ 00:33) (95% - 97%)  I&O's Summary    22 Oct 2024 07:01  -  23 Oct 2024 00:46  --------------------------------------------------------  IN: 0 mL / OUT: 1600 mL / NET: -1600 mL        General: Pt Alert and oriented, NAD  Prineo / gauze DSG C/D/I  Pulses intact  Sensation: SILT  Motor: Quad/Ham/EHL/FHL/TA/GS 5/5        A/P: 54yFemale POD#0 s/p L ORLY  - Stable  - Pain Control  - DVT ppx: SCDs  - Post op abx: Ancef x 2 doses  - PT, WBS: WBAT  - Dispo: Pending PT eval      Ortho Pager [phone]
POST OPERATIVE DAY #: 1  STATUS POST: Left THR                        SUBJECTIVE: Patient seen and examined. Pt. states she went to the bathroom and became dizzy this am. Pt. got back into bed and started to eat bagel which is making her feel better. Pt. states the same thing happened to her 9y when she did her right thr. Pt. states she was in a lot of pain last night, took tramadol, tylenol, oxycodone (oxycodone worked best but does not want to take much of it). Denies any sob/cp/n/v/numbness or tingling in b/l les.     OBJECTIVE:     Vital Signs Last 24 Hrs  T(C): 36.7 (23 Oct 2024 09:00), Max: 36.7 (23 Oct 2024 09:00)  T(F): 98 (23 Oct 2024 09:00), Max: 98 (23 Oct 2024 09:00)  HR: 59 (23 Oct 2024 09:00) (50 - 83)  BP: 100/67 (23 Oct 2024 09:00) (94/61 - 110/63)  BP(mean): 78 (23 Oct 2024 09:00) (74 - 81)  RR: 17 (23 Oct 2024 09:00) (15 - 24)  SpO2: 97% (23 Oct 2024 09:00) (95% - 100%)    Parameters below as of 23 Oct 2024 09:00  Patient On (Oxygen Delivery Method): room air        General: NAD, some dizziness, eating bagel in bed  Affected extremity: left le skin intact, no erythema/ecchymosis  Dressing: clean/dry/intact prineo  Sensation: intact to light touch to patient's baseline  Motor exam: EHL/TA/GS  5/5  Pulses DP/TP 2+             I&O's Detail    22 Oct 2024 07:01  -  23 Oct 2024 07:00  --------------------------------------------------------  IN:  Total IN: 0 mL    OUT:    Intermittent Catheterization - Urethral (mL): 1400 mL    Voided (mL): 1200 mL  Total OUT: 2600 mL    Total NET: -2600 mL      23 Oct 2024 07:01  -  23 Oct 2024 11:05  --------------------------------------------------------  IN:    Lactated Ringers: 225 mL    Oral Fluid: 120 mL  Total IN: 345 mL    OUT:    Voided (mL): 400 mL  Total OUT: 400 mL    Total NET: -55 mL          LABS:                        10.3   12.94 )-----------( 259      ( 23 Oct 2024 05:30 )             31.6     10-23    143  |  105  |  10  ----------------------------<  105[H]  4.0   |  29  |  0.78    Ca    8.7      23 Oct 2024 05:30        Urinalysis Basic - ( 23 Oct 2024 05:30 )    Color: x / Appearance: x / SG: x / pH: x  Gluc: 105 mg/dL / Ketone: x  / Bili: x / Urobili: x   Blood: x / Protein: x / Nitrite: x   Leuk Esterase: x / RBC: x / WBC x   Sq Epi: x / Non Sq Epi: x / Bacteria: x        MEDICATIONS:    acetaminophen     Tablet .. 1000 milliGRAM(s) Oral every 8 hours  HYDROmorphone  Injectable 0.5 milliGRAM(s) IV Push once  ondansetron Injectable 4 milliGRAM(s) IV Push every 6 hours PRN  oxyCODONE    IR 5 milliGRAM(s) Oral every 6 hours PRN  traMADol 50 milliGRAM(s) Oral every 6 hours PRN    aspirin enteric coated 81 milliGRAM(s) Oral two times a day        ASSESSMENT AND PLAN: 53yo Female s/p left thr     1. Analgesic pain control  2. DVT prophylaxis: ASA      SCDs       3. Weight Bearing Status:  Weight bearing as tolerated        4. Disposition: Home pending clearance  5. Dizziness this am, hypotensive to 100/67, given 500cc bolus LR. Encouraged to eat breakfast. Will continue to monitor. PT will see later.

## 2024-10-25 ENCOUNTER — NON-APPOINTMENT (OUTPATIENT)
Age: 54
End: 2024-10-25

## 2024-11-01 DIAGNOSIS — I73.00 RAYNAUD'S SYNDROME WITHOUT GANGRENE: ICD-10-CM

## 2024-11-01 DIAGNOSIS — I95.9 HYPOTENSION, UNSPECIFIED: ICD-10-CM

## 2024-11-01 DIAGNOSIS — M16.12 UNILATERAL PRIMARY OSTEOARTHRITIS, LEFT HIP: ICD-10-CM

## 2024-11-01 DIAGNOSIS — Z96.641 PRESENCE OF RIGHT ARTIFICIAL HIP JOINT: ICD-10-CM

## 2024-11-01 DIAGNOSIS — D64.9 ANEMIA, UNSPECIFIED: ICD-10-CM

## 2024-11-01 DIAGNOSIS — D72.829 ELEVATED WHITE BLOOD CELL COUNT, UNSPECIFIED: ICD-10-CM

## 2024-11-01 DIAGNOSIS — Z87.898 PERSONAL HISTORY OF OTHER SPECIFIED CONDITIONS: ICD-10-CM

## 2024-11-15 ENCOUNTER — APPOINTMENT (OUTPATIENT)
Dept: ORTHOPEDIC SURGERY | Facility: CLINIC | Age: 54
End: 2024-11-15
Payer: MEDICAID

## 2024-11-15 ENCOUNTER — RESULT REVIEW (OUTPATIENT)
Age: 54
End: 2024-11-15

## 2024-11-15 ENCOUNTER — OUTPATIENT (OUTPATIENT)
Dept: OUTPATIENT SERVICES | Facility: HOSPITAL | Age: 54
LOS: 1 days | End: 2024-11-15
Payer: MEDICAID

## 2024-11-15 VITALS
OXYGEN SATURATION: 97 % | HEART RATE: 64 BPM | HEIGHT: 63 IN | DIASTOLIC BLOOD PRESSURE: 71 MMHG | SYSTOLIC BLOOD PRESSURE: 106 MMHG

## 2024-11-15 DIAGNOSIS — Z98.890 OTHER SPECIFIED POSTPROCEDURAL STATES: Chronic | ICD-10-CM

## 2024-11-15 DIAGNOSIS — Z96.642 PRESENCE OF LEFT ARTIFICIAL HIP JOINT: ICD-10-CM

## 2024-11-15 PROBLEM — Z86.79 PERSONAL HISTORY OF OTHER DISEASES OF THE CIRCULATORY SYSTEM: Chronic | Status: ACTIVE | Noted: 2024-10-21

## 2024-11-15 PROBLEM — M19.90 UNSPECIFIED OSTEOARTHRITIS, UNSPECIFIED SITE: Chronic | Status: ACTIVE | Noted: 2024-10-21

## 2024-11-15 PROCEDURE — 99024 POSTOP FOLLOW-UP VISIT: CPT

## 2024-11-15 PROCEDURE — 73502 X-RAY EXAM HIP UNI 2-3 VIEWS: CPT

## 2024-11-15 PROCEDURE — 73502 X-RAY EXAM HIP UNI 2-3 VIEWS: CPT | Mod: 26,LT

## 2024-12-06 ENCOUNTER — NON-APPOINTMENT (OUTPATIENT)
Age: 54
End: 2024-12-06

## 2025-01-17 ENCOUNTER — APPOINTMENT (OUTPATIENT)
Dept: ORTHOPEDIC SURGERY | Facility: CLINIC | Age: 55
End: 2025-01-17

## 2025-09-17 ENCOUNTER — NON-APPOINTMENT (OUTPATIENT)
Age: 55
End: 2025-09-17

## (undated) DEVICE — GLV 7.5 PROTEXIS ORTHO (CREAM)

## (undated) DEVICE — HOOD T7 PLUS PEELAWAY

## (undated) DEVICE — SUT ETHIBOND 5 4-30" V-37

## (undated) DEVICE — SUT PDS II PLUS 1 27" CT-1

## (undated) DEVICE — MAKO VIZADISC HIP PROCEDURE TRACKING KIT

## (undated) DEVICE — SUT STRATAFIX SYMMETRIC PDS PLUS 1 18" CT

## (undated) DEVICE — ELCTR STRYKER NEPTUNE SMOKE EVACUATION PENCIL (GREEN)

## (undated) DEVICE — MAKO DRAPE KIT

## (undated) DEVICE — SUT MONOCRYL 3-0 18" PS-1

## (undated) DEVICE — BLADE SURGICAL #15 CARBON

## (undated) DEVICE — GLV 7.5 PROTEXIS (WHITE)

## (undated) DEVICE — SUT STRATAFIX SPIRAL PDS PLUS 1 30X30CM OS-6 VIOLET

## (undated) DEVICE — SUT ETHILON 3-0 18" PS-1

## (undated) DEVICE — DRAPE LIGHT HANDLE COVER (GREEN)

## (undated) DEVICE — GLV 6.5 PROTEXIS (WHITE)

## (undated) DEVICE — DRAPE LIGHT HANDLE COVER (BLUE)

## (undated) DEVICE — SUT MONOCRYL 3-0 27" PS-1 UNDYED

## (undated) DEVICE — PACK TOTAL HIP

## (undated) DEVICE — SUT VICRYL 2-0 27" CT-1 UNDYED

## (undated) DEVICE — DRAPE TOWEL BLUE 17" X 24"

## (undated) DEVICE — DRSG DERMABOND PRINEO 22CM

## (undated) DEVICE — DRAPE HIP W POUCHES 87X115X134"

## (undated) DEVICE — PACK ANTERIOR HIP ACSRY LNX SURGICOUNT

## (undated) DEVICE — PREP DURAPREP 26CC

## (undated) DEVICE — DRSG TELFA 3 X 8